# Patient Record
Sex: FEMALE | Race: WHITE | Employment: FULL TIME | URBAN - METROPOLITAN AREA
[De-identification: names, ages, dates, MRNs, and addresses within clinical notes are randomized per-mention and may not be internally consistent; named-entity substitution may affect disease eponyms.]

---

## 2022-04-20 ENCOUNTER — TRANSCRIBE ORDER (OUTPATIENT)
Dept: SCHEDULING | Age: 67
End: 2022-04-20

## 2022-04-20 DIAGNOSIS — Z12.31 OTHER SCREENING MAMMOGRAM: Primary | ICD-10-CM

## 2022-04-20 DIAGNOSIS — M81.0 OSTEOPOROSIS: ICD-10-CM

## 2022-04-26 ENCOUNTER — HOSPITAL ENCOUNTER (OUTPATIENT)
Dept: MAMMOGRAPHY | Age: 67
Discharge: HOME | End: 2022-04-26
Attending: FAMILY MEDICINE
Payer: COMMERCIAL

## 2022-04-26 DIAGNOSIS — Z12.31 OTHER SCREENING MAMMOGRAM: ICD-10-CM

## 2022-04-26 DIAGNOSIS — M81.0 OSTEOPOROSIS: ICD-10-CM

## 2022-04-26 PROCEDURE — 77063 BREAST TOMOSYNTHESIS BI: CPT

## 2022-04-26 PROCEDURE — 77080 DXA BONE DENSITY AXIAL: CPT

## 2022-05-20 ENCOUNTER — HOSPITAL ENCOUNTER (EMERGENCY)
Age: 67
Discharge: HOME | End: 2022-05-20
Attending: EMERGENCY MEDICINE | Admitting: EMERGENCY MEDICINE
Payer: COMMERCIAL

## 2022-05-20 ENCOUNTER — APPOINTMENT (OUTPATIENT)
Dept: GENERAL RADIOLOGY | Age: 67
End: 2022-05-20
Attending: PHYSICIAN ASSISTANT
Payer: COMMERCIAL

## 2022-05-20 VITALS
OXYGEN SATURATION: 97 % | HEART RATE: 102 BPM | BODY MASS INDEX: 24.44 KG/M2 | DIASTOLIC BLOOD PRESSURE: 89 MMHG | SYSTOLIC BLOOD PRESSURE: 138 MMHG | RESPIRATION RATE: 16 BRPM | WEIGHT: 121.25 LBS | HEIGHT: 59 IN | TEMPERATURE: 98.2 F
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
## 2022-05-20 DIAGNOSIS — S42.025A CLOSED NONDISPLACED FRACTURE OF SHAFT OF LEFT CLAVICLE, INITIAL ENCOUNTER: Primary | ICD-10-CM

## 2022-05-20 PROCEDURE — 99283 EMERGENCY DEPT VISIT LOW MDM: CPT

## 2022-05-20 PROCEDURE — 74011250637 HC RX REV CODE- 250/637: Performed by: PHYSICIAN ASSISTANT

## 2022-05-20 PROCEDURE — 73030 X-RAY EXAM OF SHOULDER: CPT

## 2022-05-20 RX ORDER — QUINAPRIL 20 MG/1
20 TABLET ORAL DAILY
COMMUNITY

## 2022-05-20 RX ORDER — IBUPROFEN 400 MG/1
800 TABLET ORAL
Status: COMPLETED | OUTPATIENT
Start: 2022-05-20 | End: 2022-05-20

## 2022-05-20 RX ORDER — FAMOTIDINE 20 MG/1
20 TABLET, FILM COATED ORAL 2 TIMES DAILY
Qty: 20 TABLET | Refills: 0 | Status: SHIPPED | OUTPATIENT
Start: 2022-05-20 | End: 2022-05-30

## 2022-05-20 RX ADMIN — IBUPROFEN 800 MG: 400 TABLET, FILM COATED ORAL at 14:34

## 2022-05-20 NOTE — DISCHARGE INSTRUCTIONS
Max dose of Acetaminophen (Tylenol):  4 g per day from all sources. Please note most acetaminophen is dosed in mg. As such, 4000 mg per day is the maximum dosing per day. Please also note that some cold medications contain acetaminophen. Please do not take this high dosing for long periods of time as this can affect your liver. Max dose of ibuprofen (Advil, Aleve):  2400 mg per day from all sources. You can take either 600 mg four times daily or 800 mg three times daily. Please do not exceed this as it can be hard on your kidneys. Please take with a small amount of food if it causes stomach upset. Please do not take this high dosing for long periods of time as this can cause adverse effects, such as ulcers and GI bleeding. Given your history of a stomach ulcer as a child, please take course of Pepcid while you treat your clavicle fracture and please lean on Tylenol as opposed to ibuprofen. Please do not sleep with the sling in place as this can be a strangulation hazard.

## 2022-05-20 NOTE — ED NOTES
L arm sling applied. PA reviewed discharge instructions with the patient. The patient verbalized understanding.

## 2022-05-20 NOTE — ED TRIAGE NOTES
Pt ambulatory to ED with c/o left shoudler injury last night. \"I got startled when I got up to use the restroom and fell onto my shoulder\". Denies LOC.

## 2022-05-20 NOTE — ED PROVIDER NOTES
Mike Hdz is a 74 y.o. female with PMhx of HTN, DM who presents to ED with cc of/10 left shoulder pain. Patient reports she had a mechanical fall around 430 this morning and landed on her left shoulder. She denies paresthesias or wound. Denies head injury, loss of consciousness, blood thinner use, neck pain or back pain. She denies chest pain or shortness of breath. PMHx: Reviewed, as below. PSHx: Reviewed, as below. PCP: Anabel Witt MD    There are no other complaints verbalized at this time. Past Medical History:   Diagnosis Date    Diabetes ([facility] [address]     Hypertension        Past Surgical History:   Procedure Laterality Date    HX CATARACT REMOVAL           History reviewed. No pertinent family history. Social History     Socioeconomic History    Marital status: SINGLE     Spouse name: Not on file    Number of children: Not on file    Years of education: Not on file    Highest education level: Not on file   Occupational History    Not on file   Tobacco Use    Smoking status: Never Smoker    Smokeless tobacco: Never Used   Substance and Sexual Activity    Alcohol use: Yes    Drug use: Never    Sexual activity: Not on file   Other Topics Concern    Not on file   Social History Narrative    Not on file     Social Determinants of Health     Financial Resource Strain:     Difficulty of Paying Living Expenses: Not on file   Food Insecurity:     Worried About Running Out of Food in the Last Year: Not on file    [place] of Food in the Last Year: Not on file   Transportation Needs:     Lack of Transportation (Medical): Not on file    Lack of Transportation (Non-Medical):  Not on file   Physical Activity:     Days of Exercise per Week: Not on file    Minutes of Exercise per Session: Not on file   Stress:     Feeling of Stress : Not on file   Social Connections:     Frequency of Communication with Friends and Family: Not on file    Frequency of Social Gatherings with Friends and Family: Not on file    Attends [employer] Services: Not on file    Active Member of Clubs or Organizations: Not on file    Attends Club or Organization Meetings: Not on file    Marital Status: Not on file   Intimate Partner Violence:     Fear of Current or Ex-Partner: Not on file    Emotionally Abused: Not on file    Physically Abused: Not on file    Sexually Abused: Not on file   Housing Stability:     Unable to Pay for Housing in the Last Year: Not on file    Number of [place] in the Last Year: Not on file    Unstable Housing in the Last Year: Not on file         ALLERGIES: Penicillins    Review of Systems   Respiratory: Negative for shortness of breath. Cardiovascular: Negative for chest pain. Musculoskeletal: Positive for arthralgias. Negative for neck pain. Skin: Negative for wound. Neurological: Negative for syncope, numbness and headaches. All other systems reviewed and are negative. Vitals:    05/20/22 1408   BP: 138/89   Pulse: (!) 102   Resp: 16   Temp: 98.2 °F (36.8 °C)   SpO2: 97%   Weight: 55 kg (121 lb 4.1 oz)   Height: 4' 11\" (1.499 m)            Physical Exam  Vitals and nursing note reviewed. Constitutional:       Appearance: Normal appearance. She is not diaphoretic. HENT:      Head: Atraumatic. Right Ear: External ear normal.      Left Ear: External ear normal.   Eyes:      Conjunctiva/sclera: Conjunctivae normal.   Cardiovascular:      Rate and Rhythm: Normal rate and regular rhythm. Heart sounds: Normal heart sounds. No murmur heard. No friction rub. No gallop. Pulmonary:      Effort: No respiratory distress. Breath sounds: Normal breath sounds. No stridor. No wheezing, rhonchi or rales. Abdominal:      General: Bowel sounds are normal. There is no distension. Palpations: Abdomen is soft. Tenderness: There is no abdominal tenderness. There is no guarding or rebound. Hernia: No hernia is present.    Musculoskeletal: General: No swelling. Cervical back: Normal range of motion. No rigidity or bony tenderness. Thoracic back: No bony tenderness. Lumbar back: No bony tenderness. Comments: Tenderness to palpation along chest wall. Tenderness to palpation along anterior left shoulder. No tenderness to palpation noted along posterior aspect of shoulder, scapula or chest wall along here. No tenderness to palpation along humerus, elbow or distal arm. Neurovascular intact distally with radial, median and ulnar nerve motor or sensory distributions intact. 2+ radial pulse. No noted break in the skin or skin tenting along clavicle. Skin:     General: Skin is warm and dry. Neurological:      Mental Status: She is alert and oriented to person, place, and time. Mental status is at baseline. Gait: Gait normal.          MDM  Number of Diagnoses or Management Options     Amount and/or Complexity of Data Reviewed  Tests in the radiology section of CPT®: ordered and reviewed  Discuss the patient with other providers: yes (Dr Argelia Servin, ED attending  Orthopedics)           Procedures          CONSULT NOTE:   2:57 PM  Narcisa Toscano PA-C spoke with Dr Avelino Thomas and Marques MARQUEZ,   Specialty: Orthopedics  Discussed pt's hx, disposition, and available diagnostic and imaging results. Reviewed care plans. We will plan for sling and follow-up with Dr. Avelino Thomas. VITAL SIGNS:  Vitals:    05/20/22 1408   BP: 138/89   Pulse: (!) 102   Resp: 16   Temp: 98.2 °F (36.8 °C)   SpO2: 97%   Weight: 55 kg (121 lb 4.1 oz)   Height: 4' 11\" (1.499 m)         LABS:  No results found for this or any previous visit (from the past 24 hour(s)). IMAGING:  XR SHOULDER LT AP/LAT MIN 2 V   Final Result      Comminuted left clavicular diaphyseal extra articular fracture.             Medications During Visit:  Medications   ibuprofen (MOTRIN) tablet 800 mg (800 mg Oral Given 5/20/22 1434)         DECISION MAKING:    Melita Jorge is a 74 y.o. female who comes in as above. Reports mechanical fall with subsequent left shoulder pain. Strays demonstrating comminuted left clavicular diaphyseal extra-articular fracture. She is neurovascularly intact distally. There is no evidence of skin tenting or noted break in the skin. She has no chest pain, shortness of breath and she is 97% on room air. Have discussed with orthopedics. We will plan for sling, follow-up and other supportive treatment. Pt has been given close return precautions as well as follow up recommendations. Opportunity for questions presented. Pt verbalizes their understanding and agreement with care plan. IMPRESSION:  1. Closed nondisplaced fracture of shaft of left clavicle, initial encounter    2. Fall, initial encounter        DISPOSITION:  Discharged      Discharge Medication List as of 5/20/2022  3:10 PM      START taking these medications    Details   famotidine (Pepcid) 20 mg tablet Take 1 Tablet by mouth two (2) times a day for 10 days. , Print, Disp-20 Tablet, R-0         CONTINUE these medications which have NOT CHANGED    Details   metformin HCl (METFORMIN PO) Take  by mouth., Historical Med      quinapriL (ACCUPRIL) 20 mg tablet Take 20 mg by mouth daily. , Historical Med              Follow-up Information     Follow up With Specialties Details Why Contact Info    Caitlin Adhikari MD Orthopedic Surgery Schedule an appointment as soon as possible for a visit   [address] [place] [address]  [phone]      [address] [facility] Emergency Medicine Go to  As needed, If symptoms worsen [address] [place] [address]  [phone]            The patient is asked to follow-up with their primary care provider and any other physicians as above. We have discussed strict return precautions and the patient is asked to return promptly for any increased concerns or worsening of symptoms and for return precautions regarding their symptoms today.   They can return to this emergency department or any other emergency department. I have discussed with them results as above and presented opportunity for questions. They verbalize their understanding of the above and agreement with care plan. Please note that this dictation was completed with [employer], the computer voice recognition software. Quite often unanticipated grammatical, syntax, homophones, and other interpretive errors are inadvertently transcribed by the computer software. Please disregard these errors. Please excuse any errors that have escaped final proofreading.

## 2022-05-25 ENCOUNTER — HOSPITAL ENCOUNTER (OUTPATIENT)
Dept: PREADMISSION TESTING | Age: 67
Discharge: HOME | End: 2022-05-25
Payer: COMMERCIAL

## 2022-05-25 VITALS
WEIGHT: 118.39 LBS | DIASTOLIC BLOOD PRESSURE: 83 MMHG | OXYGEN SATURATION: 100 % | HEART RATE: 90 BPM | HEIGHT: 58 IN | SYSTOLIC BLOOD PRESSURE: 148 MMHG | BODY MASS INDEX: 24.85 KG/M2 | TEMPERATURE: 97.8 F

## 2022-05-25 LAB
ANION GAP SERPL CALC-SCNC: 4 MMOL/L (ref 5–15)
BASOPHILS # BLD: 0 K/UL (ref 0–0.1)
BASOPHILS NFR BLD: 1 % (ref 0–1)
BUN SERPL-MCNC: 30 MG/DL (ref 6–20)
BUN/CREAT SERPL: 68 (ref 12–20)
CALCIUM SERPL-MCNC: 9.4 MG/DL (ref 8.5–10.1)
CHLORIDE SERPL-SCNC: 108 MMOL/L (ref 97–108)
CO2 SERPL-SCNC: 29 MMOL/L (ref 21–32)
CREAT SERPL-MCNC: 0.44 MG/DL (ref 0.55–1.02)
DIFFERENTIAL METHOD BLD: ABNORMAL
EOSINOPHIL # BLD: 0.1 K/UL (ref 0–0.4)
EOSINOPHIL NFR BLD: 1 % (ref 0–7)
ERYTHROCYTE [DISTWIDTH] IN BLOOD BY AUTOMATED COUNT: 13.2 % (ref 11.5–14.5)
EST. AVERAGE GLUCOSE BLD GHB EST-MCNC: 120 MG/DL
GLUCOSE SERPL-MCNC: 116 MG/DL (ref 65–100)
HBA1C MFR BLD: 5.8 % (ref 4–5.6)
HCT VFR BLD AUTO: 42.9 % (ref 35–47)
HGB BLD-MCNC: 13.9 G/DL (ref 11.5–16)
IMM GRANULOCYTES # BLD AUTO: 0.1 K/UL (ref 0–0.04)
IMM GRANULOCYTES NFR BLD AUTO: 1 % (ref 0–0.5)
LYMPHOCYTES # BLD: 1.6 K/UL (ref 0.8–3.5)
LYMPHOCYTES NFR BLD: 21 % (ref 12–49)
MCH RBC QN AUTO: 30.1 PG (ref 26–34)
MCHC RBC AUTO-ENTMCNC: 32.4 G/DL (ref 30–36.5)
MCV RBC AUTO: 92.9 FL (ref 80–99)
MONOCYTES # BLD: 0.6 K/UL (ref 0–1)
MONOCYTES NFR BLD: 8 % (ref 5–13)
NEUTS SEG # BLD: 5.2 K/UL (ref 1.8–8)
NEUTS SEG NFR BLD: 68 % (ref 32–75)
NRBC # BLD: 0 K/UL (ref 0–0.01)
NRBC BLD-RTO: 0 PER 100 WBC
PLATELET # BLD AUTO: 457 K/UL (ref 150–400)
PMV BLD AUTO: 9.2 FL (ref 8.9–12.9)
POTASSIUM SERPL-SCNC: 4.4 MMOL/L (ref 3.5–5.1)
RBC # BLD AUTO: 4.62 M/UL (ref 3.8–5.2)
SODIUM SERPL-SCNC: 141 MMOL/L (ref 136–145)
WBC # BLD AUTO: 7.6 K/UL (ref 3.6–11)

## 2022-05-25 PROCEDURE — 83036 HEMOGLOBIN GLYCOSYLATED A1C: CPT

## 2022-05-25 PROCEDURE — 85025 COMPLETE CBC W/AUTO DIFF WBC: CPT

## 2022-05-25 PROCEDURE — 80048 BASIC METABOLIC PNL TOTAL CA: CPT

## 2022-05-25 RX ORDER — CHOLESTYRAMINE 4 G/5.5G
POWDER, FOR SUSPENSION ORAL DAILY
COMMUNITY

## 2022-05-25 RX ORDER — ASPIRIN 81 MG/1
TABLET ORAL DAILY
COMMUNITY

## 2022-05-25 RX ORDER — ASCORBIC ACID 500 MG
TABLET ORAL
COMMUNITY

## 2022-05-25 RX ORDER — CHOLECALCIFEROL (VITAMIN D3) 50 MCG
CAPSULE ORAL
COMMUNITY

## 2022-05-25 NOTE — PERIOP NOTES
[id number] INSTRUCTIONS    Surgery Date:   5/26/22    [facility] staff will call you between 4 PM- 8 PM the day before surgery with your arrival time. If your surgery is on a Monday, we will call you the preceding Friday. Please call [phone] after 8 PM if you did not receive your arrival time. 1. Please report to [facility] Patient Access/Admitting on the 1st floor. Bring your insurance card, photo identification, and any copayment ( if applicable). 2. If you are going home the same day of your surgery, you must have a responsible adult to drive you home. You need to have a responsible adult to stay with you the first 24 hours after surgery and you should not drive a car for 24 hours following your surgery. 3. Nothing to eat or drink after midnight the night before surgery. This includes no water, gum, mints, coffee, juice, etc.  Please note special instructions, if applicable, below for medications. 4. Do NOT drink alcohol or smoke 24 hours before surgery. STOP smoking for 14 days prior as it helps with breathing and healing after surgery. 5. If you are being admitted to the hospital, please leave personal belongings/luggage in your car until you have an assigned hospital room number. 6. Please wear comfortable clothes. Wear your glasses instead of contacts. We ask that all money, jewelry and valuables be left at home. Wear no make up, particularly mascara, the day of surgery. 7.  All body piercings, rings, and jewelry need to be removed and left at home. Please remove any nail polish or artificial nails from your fingernails. Please wear your hair loose or down. Please no pony-tails, buns, or any metal hair accessories. If you shower the morning of surgery, please do not apply any lotions or powders afterwards. You may wear deodorant, unless having breast surgery. Do not shave any body area within 24 hours of your surgery.   8. Please follow all instructions to avoid any potential surgical cancellation. 9. Should your physical condition change, (i.e. fever, cold, flu, etc.) please notify your surgeon as soon as possible. 10. It is important to be on time. If a situation occurs where you may be delayed, please call:  [phone] / [phone] on the day of surgery. 11. The Preadmission Testing staff can be reached at [phone]. 12. Special instructions: NONE      Current Outpatient Medications   Medication Sig    Cholestyramine-Aspartame (Prevalite) 4 gram powder Take  by mouth daily.  aspirin delayed-release 81 mg tablet Take  by mouth daily.  omega 3-dha-epa-fish oil (Fish OiL) 100-160-1,000 mg cap Take  by mouth.  ascorbic acid, vitamin C, (Vitamin C) 500 mg tablet Take  by mouth.  OTHER PLANT STEROLS    berberine/herbal complex no.18 (BERBERINE-HERBAL COMB NO.18 PO) Take  by mouth.  OTHER THISOLYN    metformin HCl (METFORMIN PO) Take 500 mg by mouth two (2) times a day.  quinapriL (ACCUPRIL) 20 mg tablet Take 20 mg by mouth daily.  famotidine (Pepcid) 20 mg tablet Take 1 Tablet by mouth two (2) times a day for 10 days. No current facility-administered medications for this encounter. 1. YOU MUST ONLY TAKE THESE MEDICATIONS THE MORNING OF SURGERY WITH A SIP OF WATER: FAMOTIDINE  2. MEDICATIONS TO TAKE THE MORNING OF SURGERY ONLY IF NEEDED: NONE  3. HOLD these prescription medications BEFORE Surgery: METFORMIN (STOP ON 5/25/22)  4. Ask your surgeon/prescribing physician about when/if to STOP taking these medications: ASPIRIN  5. Stop all vitamins, herbal medicines and Aspirin containing products 7 days prior to surgery. Stop any non-steroidal anti-inflammatory drugs (i.e. Ibuprofen, Naproxen, Advil, Aleve) 3 days before surgery. You may take Tylenol. 6. If you are currently taking Plavix, Coumadin, or any other blood-thinning/anticoagulant medication contact your prescribing physician for instructions.     Preventing Infections Before and After  Your Surgery    IMPORTANT INSTRUCTIONS      You play an important role in your health and preparation for surgery. To reduce the germs on your skin you will need to shower with CHG soap (Chorhexidine gluconate 4%) two times before surgery. CHG soap (Hibiclens, Hex-A-Clens or store brand)   CHG soap will be provided at your Preadmission Testing (PAT) appointment.  If you do not have a PAT appointment before surgery, you may arrange to  CHG soap from our office or purchase CHG soap at a pharmacy, grocery or department store.  You need to purchase TWO 4 ounce bottles to use for your 2 showers. Steps to follow:  1. Wash your hair with your normal shampoo and your body with regular soap and rinse well to remove shampoo and soap from your skin. 2. Wet a clean washcloth and turn off the shower. 3. Put CHG soap on washcloth and apply to your entire body from the neck down. Do not use on your head, face or private parts(genitals). Do not use CHG soap on open sores, wounds or areas of skin irritation. 4. Wash you body gently for 5 minutes. Do not wash your skin too hard. This soap does not create lather. Pay special attention to your underarms and from your belly button to your feet. 5. Turn the shower back on and rinse well to get CHG soap off your body. 6. Pat your skin dry with a clean, dry towel. Do not apply lotions or moisturizer. 7. Put on clean clothes and sleep on fresh bed sheets and do not allow pets to sleep with you. Shower with CHG soap 2 times before your surgery   The evening before your surgery   The morning of your surgery      Tips to help prevent infections after your surgery:  1. Protect your surgical wound from germs:  ? Hand washing is the most important thing you and your caregivers can do to prevent infections. ? Keep your bandage clean and dry! ? Do not touch your surgical wound.   2. Use clean, freshly washed towels and washcloths every time you shower; do not share bath linens with others. 3. Until your surgical wound is healed, wear clothing and sleep on bed linens each day that are clean and freshly washed. 4. Do not allow pets to sleep in your bed with you or touch your surgical wound. 5. Do not smoke  smoking delays wound healing. This may be a good time to stop smoking. 6. If you have diabetes, it is important for you to manage your blood sugar levels properly before your surgery as well as after your surgery. Poorly managed blood sugar levels slow down wound healing and prevent you from healing completely. Patient Information Regarding COVID Restrictions    Day of Procedure     Please park in the parking deck or any designated visitor parking lot.  [facility]   On the day of surgery, please provide the cell phone number of the person who will be waiting for you to the Patient Access representative at the time of registration.  Please wear a mask on the day of your procedure.  We are now allowing two designated visitors per stay. Pediatric patients may have 2 designated visitors. These two people may come in with you on the day of your procedure.  No visitors under the age of 13.  The designated visitor must also wear a mask.  Once your procedure and the immediate recovery period is completed, a nurse in the recovery area will contact your designated visitor to inform them of your room number or to otherwise review other pertinent information regarding your care.  Social distancing practices are to be adhered to in waiting areas and the cafeteria. The patient was contacted  in person. She verbalized understanding of all instructions and does not  need reinforcement.

## 2022-05-25 NOTE — H&P
CARE TEAM:  Patient Care Team:  Cynthia Goldstein MD as PCP - General (Family Medicine)     Note: This chart was prepared using voice-recognition software and may contain unintended word substitution errors. An addendum for corrections can be made upon request.      ASSESSMENT    Diagnosis Plan   1. Closed displaced fracture of shaft of left clavicle, initial encounter HYDROcodone-acetaminophen (NORCO) 5-325 MG per tablet       2. Injury of left clavicle, initial encounter X-ray clavicle left (02073)          There is no problem list on file for this patient.        PLAN  Treatment Plan:  -We discussed the patient's diagnosis and reviewed the diagnostic imaging. Showed her the displacement of the clavicle fracture  -We reviewed nonoperative treatment options including rest, ice/heat, compression, elevation, and sling use  -We discussed surgical and nonsurgical management options. Because of the displacement, I recommended surgical fixation. We discussed the risks, benefits, complications, convalescence regarding open reduction internal fixation of the clavicle (65342). I addressed with her the chest wall numbness and high percentage of patients who are agitated by the subcutaneous plane and request removal.  She displayed good understanding and agreed to proceed forth with surgery  -a prescription was sent for hydrocodone so that she can pick it up and have it at her house before surgery for postoperative pain control. I will also ask for an interscalene block for pain relief during the initial 24 hours. -planning surgery Thursday 05/26/2022 at [facility] at 9:30 a.m.            Orders Placed This Encounter    X-ray clavicle left (99292)    BP Patient Education    HYDROcodone-acetaminophen ([address]) 5-325 MG per tablet            Return for Post-Op Check.        HISTORY OF PRESENT ILLNESS  Chief Complaint: Injury and Pain of the Left Shoulder     Age: 74 y.o.   Sex: female   Hand-dominance: RightHistory of present illness: Ms. Andrew Mcgregor presents today for evaluation of left shoulder pain. Symptoms began 5/20/22. She was startled by her alarm clock early in the morning which caused her to roll off the bed and land on her side. She was seen in the emergency department and found to have a clavicle fracture. She is placed in a sling and instructor for follow-up. Overall her pain has been controlled. She says that they gave her medications for pain, but they actually gave her from [place]. She has bruising over the site. She denies any previous similar symptoms. She is scheduled to retire at the end of June but has a lot of a medical leave that she can take.     Pain rating =   5 out of 10        Medical History        Past Medical History:   Diagnosis Date    Diabetes mellitus      Dry eyes      Hypertension              Surgical History   History reviewed. No pertinent surgical history.           Current Outpatient Medications:    famotidine (PEPCID) 20 MG tablet, TAKE 1 TABLET BY MOUTH TWO TIMES A DAY FOR 10 DAYS, Disp: , Rfl:    metFORMIN (GLUCOPHAGE) 500 MG tablet, Take 500 mg by mouth in the morning and 500 mg before bedtime. , Disp: , Rfl:    quinapril (ACCUPRIL) 20 MG tablet, every evening, Disp: , Rfl:    Xiidra 5 % solution, INSTILL 1 DROP INTO BOTH EYES TWICE A DAY, Disp: , Rfl:    HYDROcodone-acetaminophen (NORCO) 5-325 MG per tablet, Take 1-2 tablets by mouth every 6 (six) hours as needed for moderate pain or severe pain for up to 7 days Take 1-2  tabs every 6 hours as needed for moderate-severe pain, Disp: 30 tablet, Rfl: 0      History reviewed.  No pertinent family history.      Social History               Socioeconomic History    Marital status: Single       Spouse name: Not on file    Number of children: Not on file    Years of education: Not on file    Highest education level: Not on file   Occupational History    Not on file   Tobacco Use    Smoking status: Never    Smokeless tobacco: Never   Substance and Sexual Activity    Alcohol use: Not Currently    Drug use: Never    Sexual activity: Not on file   Other Topics Concern    Not on file   Social History Narrative    Not on file      Social Determinants of Health      Financial Resource Strain: Not on file   Food Insecurity: Not on file   Transportation Needs: Not on file   Physical Activity: Not on file   Stress: Not on file   Social Connections: Not on file   Intimate Partner Violence: Not on file   Housing Stability: Not on file               OBJECTIVE  Constitutional:  No acute distress. Her body mass index is 24.86 kg/m². Eyes:  Sclera are nonicteric. Respiratory:  No labored breathing. Cardiovascular:  No marked edema. Skin:  No marked skin ulcers. Neurological:  No marked sensory loss noted. Psychiatric: Alert and oriented x3.     Left Shoulder Exam      Muscle Strength  Abduction: 4/5  Internal rotation: 4/5  External rotation: 4/5                Shoulder Musculoskeletal Exam     Inspection    Left      Ecchymosis: moderate      Peripheral edema: mild      Atrophy: none      Symmetry: symmetric      Masses: none     Palpation    Left      Crepitus: no crepitus      Increased warmth: none      Tenderness: present        Clavicle: moderate        AC joint: mild     Range of Motion    Left      Active ROM: abnormal.     Strength    Left      External rotation: 4/5. External rotation is affected by pain. Internal rotation: 4/5. Internal rotation is affected by pain. Abduction: 4/5.  Abduction is affected by pain.      Neurovascular    Left      Radial pulse: normal      Axillary nerve sensory distribution: normal      Ulnar nerve sensory distribution: normal      Median nerve sensory distribution: normal      Radial nerve sensory distribution: normal      Musculocutaneous nerve sensory distribution: normal     Scapula    Left      Position: normal     General      Constitutional: appears stated age, well-developed and well-nourished    Scleral icterus: yes    Psychiatric: normal mood and affect and no acute distress    Neurological: alert and oriented x3     IMAGING / STUDIES   Order: XR CLAVICLE LEFT - Indication: Injury of left clavicle, initial  encounter          X-ray clavicle left ([id number])     Result Date: 5/24/2022  AP, Axial.     Impression: Two view x-rays of left clavicle show a mid shaft clavicle fracture with over 100% superior displacement of the medial fragment.         I have independently reviewed outside diagnostic imaging on 5/20/22. Left midshaft clavicle fracture. Overall alignment well maintained. Unable to assess overlap. I agree with the radiologist read:  Tricia Jay MD - 05/20/2022   Formatting of this note might be different from the original.   EXAM: XR SHOULDER LT AP/LAT MIN 2 V     INDICATION: Left shoulder pain after fall. COMPARISON: None. FINDINGS: Three views of the left shoulder demonstrate comminuted, mildly   displaced, mildly impacted fracture of the left clavicular diaphysis. Bones are   osteopenic. No evidence of proximal humerus fracture. Mild acromioclavicular   osteoarthritis. Normal glenohumeral joint space and alignment. IMPRESSION     Comminuted left clavicular diaphyseal extra articular fracture.   Exam End: 05/20/22  2:30 PM     Specimen Collected: 05/20/22  2:33 PM Last Resulted: 05/20/22  2:34 PM        PROCEDURES  Procedures           Yuly Crawford MD

## 2022-05-25 NOTE — PERIOP NOTES
Preoperative instructions reviewed with patient and her sister Clovis Piña. Patient given SSI infection FAQS sheet. Given two bottles of skin prep chlorhexidine soap; given written and verbal instructions on use. Patient/Sister was given the opportunity to ask questions on the information provided.

## 2022-05-26 ENCOUNTER — ANESTHESIA (OUTPATIENT)
Dept: SURGERY | Age: 67
End: 2022-05-26
Payer: COMMERCIAL

## 2022-05-26 ENCOUNTER — APPOINTMENT (OUTPATIENT)
Dept: GENERAL RADIOLOGY | Age: 67
End: 2022-05-26
Attending: ORTHOPAEDIC SURGERY
Payer: COMMERCIAL

## 2022-05-26 ENCOUNTER — ANESTHESIA EVENT (OUTPATIENT)
Dept: SURGERY | Age: 67
End: 2022-05-26
Payer: COMMERCIAL

## 2022-05-26 ENCOUNTER — HOSPITAL ENCOUNTER (OUTPATIENT)
Age: 67
Setting detail: OUTPATIENT SURGERY
Discharge: HOME | End: 2022-05-26
Attending: ORTHOPAEDIC SURGERY | Admitting: ORTHOPAEDIC SURGERY
Payer: COMMERCIAL

## 2022-05-26 VITALS
SYSTOLIC BLOOD PRESSURE: 119 MMHG | HEIGHT: 58 IN | TEMPERATURE: 97.6 F | OXYGEN SATURATION: 93 % | WEIGHT: 118.39 LBS | RESPIRATION RATE: 21 BRPM | HEART RATE: 92 BPM | BODY MASS INDEX: 24.85 KG/M2 | DIASTOLIC BLOOD PRESSURE: 91 MMHG

## 2022-05-26 LAB
GLUCOSE BLD STRIP.AUTO-MCNC: 114 MG/DL (ref 65–117)
GLUCOSE BLD STRIP.AUTO-MCNC: 130 MG/DL (ref 65–117)
SERVICE CMNT-IMP: ABNORMAL
SERVICE CMNT-IMP: NORMAL

## 2022-05-26 PROCEDURE — C1713 ANCHOR/SCREW BN/BN,TIS/BN: HCPCS | Performed by: ORTHOPAEDIC SURGERY

## 2022-05-26 PROCEDURE — 77030020275 HC MISC ORTHOPEDIC: Performed by: ORTHOPAEDIC SURGERY

## 2022-05-26 PROCEDURE — 74011250636 HC RX REV CODE- 250/636: Performed by: ANESTHESIOLOGY

## 2022-05-26 PROCEDURE — 76210000016 HC OR PH I REC 1 TO 1.5 HR: Performed by: ORTHOPAEDIC SURGERY

## 2022-05-26 PROCEDURE — A4565 SLINGS: HCPCS | Performed by: ORTHOPAEDIC SURGERY

## 2022-05-26 PROCEDURE — 77030040361 HC SLV COMPR DVT MDII -B: Performed by: ORTHOPAEDIC SURGERY

## 2022-05-26 PROCEDURE — 76060000035 HC ANESTHESIA 2 TO 2.5 HR: Performed by: ORTHOPAEDIC SURGERY

## 2022-05-26 PROCEDURE — 74011000250 HC RX REV CODE- 250: Performed by: ORTHOPAEDIC SURGERY

## 2022-05-26 PROCEDURE — 76010000153 HC OR TIME 1.5 TO 2 HR: Performed by: ORTHOPAEDIC SURGERY

## 2022-05-26 PROCEDURE — 77030031139 HC SUT VCRL2 J&J -A: Performed by: ORTHOPAEDIC SURGERY

## 2022-05-26 PROCEDURE — 74011250636 HC RX REV CODE- 250/636: Performed by: ORTHOPAEDIC SURGERY

## 2022-05-26 PROCEDURE — 77030002933 HC SUT MCRYL J&J -A: Performed by: ORTHOPAEDIC SURGERY

## 2022-05-26 PROCEDURE — 77030040922 HC BLNKT HYPOTHRM STRY -A

## 2022-05-26 PROCEDURE — 74011250636 HC RX REV CODE- 250/636: Performed by: NURSE ANESTHETIST, CERTIFIED REGISTERED

## 2022-05-26 PROCEDURE — 77030008684 HC TU ET CUF COVD -B: Performed by: ANESTHESIOLOGY

## 2022-05-26 PROCEDURE — 82962 GLUCOSE BLOOD TEST: CPT

## 2022-05-26 PROCEDURE — 77030020274 HC MISC IMPL ORTHOPEDIC: Performed by: ORTHOPAEDIC SURGERY

## 2022-05-26 PROCEDURE — 76210000020 HC REC RM PH II FIRST 0.5 HR: Performed by: ORTHOPAEDIC SURGERY

## 2022-05-26 PROCEDURE — 73000 X-RAY EXAM OF COLLAR BONE: CPT

## 2022-05-26 PROCEDURE — 77030026438 HC STYL ET INTUB CARD -A: Performed by: ANESTHESIOLOGY

## 2022-05-26 PROCEDURE — 2709999900 HC NON-CHARGEABLE SUPPLY: Performed by: ORTHOPAEDIC SURGERY

## 2022-05-26 PROCEDURE — 77030042508 HC BIT DRL -B: Performed by: ORTHOPAEDIC SURGERY

## 2022-05-26 PROCEDURE — 74011000250 HC RX REV CODE- 250: Performed by: NURSE ANESTHETIST, CERTIFIED REGISTERED

## 2022-05-26 PROCEDURE — 77030010507 HC ADH SKN DERMBND J&J -B: Performed by: ORTHOPAEDIC SURGERY

## 2022-05-26 PROCEDURE — 77030003601 HC NDL NRV BLK BBMI -A

## 2022-05-26 DEVICE — IMPLANTABLE DEVICE: Type: IMPLANTABLE DEVICE | Site: SHOULDER | Status: FUNCTIONAL

## 2022-05-26 DEVICE — SCR BNE NLCK LP 2.7X12MM --: Type: IMPLANTABLE DEVICE | Site: SHOULDER | Status: FUNCTIONAL

## 2022-05-26 DEVICE — SCR BNE NLCK LP 2.7X16MM -- DVR CROSSLOCK: Type: IMPLANTABLE DEVICE | Site: SHOULDER | Status: FUNCTIONAL

## 2022-05-26 DEVICE — SCR BNE NLCK LP 2.7X14MM --: Type: IMPLANTABLE DEVICE | Site: SHOULDER | Status: FUNCTIONAL

## 2022-05-26 RX ORDER — ONDANSETRON 2 MG/ML
4 INJECTION INTRAMUSCULAR; INTRAVENOUS AS NEEDED
Status: DISCONTINUED | OUTPATIENT
Start: 2022-05-26 | End: 2022-05-26 | Stop reason: HOSPADM

## 2022-05-26 RX ORDER — PROPOFOL 10 MG/ML
INJECTION, EMULSION INTRAVENOUS AS NEEDED
Status: DISCONTINUED | OUTPATIENT
Start: 2022-05-26 | End: 2022-05-26 | Stop reason: HOSPADM

## 2022-05-26 RX ORDER — SODIUM CHLORIDE 0.9 % (FLUSH) 0.9 %
5-40 SYRINGE (ML) INJECTION EVERY 8 HOURS
Status: DISCONTINUED | OUTPATIENT
Start: 2022-05-26 | End: 2022-05-26 | Stop reason: HOSPADM

## 2022-05-26 RX ORDER — DEXAMETHASONE SODIUM PHOSPHATE 4 MG/ML
INJECTION, SOLUTION INTRA-ARTICULAR; INTRALESIONAL; INTRAMUSCULAR; INTRAVENOUS; SOFT TISSUE AS NEEDED
Status: DISCONTINUED | OUTPATIENT
Start: 2022-05-26 | End: 2022-05-26 | Stop reason: HOSPADM

## 2022-05-26 RX ORDER — MORPHINE SULFATE 2 MG/ML
2 INJECTION, SOLUTION INTRAMUSCULAR; INTRAVENOUS
Status: DISCONTINUED | OUTPATIENT
Start: 2022-05-26 | End: 2022-05-26 | Stop reason: HOSPADM

## 2022-05-26 RX ORDER — SODIUM CHLORIDE 9 MG/ML
25 INJECTION, SOLUTION INTRAVENOUS CONTINUOUS
Status: DISCONTINUED | OUTPATIENT
Start: 2022-05-26 | End: 2022-05-26 | Stop reason: HOSPADM

## 2022-05-26 RX ORDER — SUCCINYLCHOLINE CHLORIDE 20 MG/ML
INJECTION INTRAMUSCULAR; INTRAVENOUS AS NEEDED
Status: DISCONTINUED | OUTPATIENT
Start: 2022-05-26 | End: 2022-05-26 | Stop reason: HOSPADM

## 2022-05-26 RX ORDER — ROCURONIUM BROMIDE 10 MG/ML
INJECTION, SOLUTION INTRAVENOUS AS NEEDED
Status: DISCONTINUED | OUTPATIENT
Start: 2022-05-26 | End: 2022-05-26 | Stop reason: HOSPADM

## 2022-05-26 RX ORDER — ROPIVACAINE HYDROCHLORIDE 5 MG/ML
30 INJECTION, SOLUTION EPIDURAL; INFILTRATION; PERINEURAL AS NEEDED
Status: DISCONTINUED | OUTPATIENT
Start: 2022-05-26 | End: 2022-05-26 | Stop reason: HOSPADM

## 2022-05-26 RX ORDER — SODIUM CHLORIDE, SODIUM LACTATE, POTASSIUM CHLORIDE, CALCIUM CHLORIDE 600; 310; 30; 20 MG/100ML; MG/100ML; MG/100ML; MG/100ML
100 INJECTION, SOLUTION INTRAVENOUS CONTINUOUS
Status: DISCONTINUED | OUTPATIENT
Start: 2022-05-26 | End: 2022-05-26 | Stop reason: HOSPADM

## 2022-05-26 RX ORDER — ONDANSETRON 2 MG/ML
INJECTION INTRAMUSCULAR; INTRAVENOUS AS NEEDED
Status: DISCONTINUED | OUTPATIENT
Start: 2022-05-26 | End: 2022-05-26 | Stop reason: HOSPADM

## 2022-05-26 RX ORDER — NORETHINDRONE AND ETHINYL ESTRADIOL 0.5-0.035
KIT ORAL AS NEEDED
Status: DISCONTINUED | OUTPATIENT
Start: 2022-05-26 | End: 2022-05-26 | Stop reason: HOSPADM

## 2022-05-26 RX ORDER — SODIUM CHLORIDE 0.9 % (FLUSH) 0.9 %
5-40 SYRINGE (ML) INJECTION AS NEEDED
Status: DISCONTINUED | OUTPATIENT
Start: 2022-05-26 | End: 2022-05-26 | Stop reason: HOSPADM

## 2022-05-26 RX ORDER — MIDAZOLAM HYDROCHLORIDE 1 MG/ML
1 INJECTION, SOLUTION INTRAMUSCULAR; INTRAVENOUS AS NEEDED
Status: DISCONTINUED | OUTPATIENT
Start: 2022-05-26 | End: 2022-05-26 | Stop reason: HOSPADM

## 2022-05-26 RX ORDER — LIDOCAINE HYDROCHLORIDE 20 MG/ML
INJECTION, SOLUTION EPIDURAL; INFILTRATION; INTRACAUDAL; PERINEURAL AS NEEDED
Status: DISCONTINUED | OUTPATIENT
Start: 2022-05-26 | End: 2022-05-26 | Stop reason: HOSPADM

## 2022-05-26 RX ORDER — ROPIVACAINE HYDROCHLORIDE 5 MG/ML
INJECTION, SOLUTION EPIDURAL; INFILTRATION; PERINEURAL
Status: COMPLETED | OUTPATIENT
Start: 2022-05-26 | End: 2022-05-26

## 2022-05-26 RX ORDER — OXYCODONE AND ACETAMINOPHEN 5; 325 MG/1; MG/1
TABLET ORAL
COMMUNITY

## 2022-05-26 RX ORDER — FENTANYL CITRATE 50 UG/ML
INJECTION, SOLUTION INTRAMUSCULAR; INTRAVENOUS AS NEEDED
Status: DISCONTINUED | OUTPATIENT
Start: 2022-05-26 | End: 2022-05-26 | Stop reason: HOSPADM

## 2022-05-26 RX ORDER — MIDAZOLAM HYDROCHLORIDE 1 MG/ML
0.5 INJECTION, SOLUTION INTRAMUSCULAR; INTRAVENOUS
Status: DISCONTINUED | OUTPATIENT
Start: 2022-05-26 | End: 2022-05-26 | Stop reason: HOSPADM

## 2022-05-26 RX ORDER — DIPHENHYDRAMINE HYDROCHLORIDE 50 MG/ML
12.5 INJECTION, SOLUTION INTRAMUSCULAR; INTRAVENOUS AS NEEDED
Status: DISCONTINUED | OUTPATIENT
Start: 2022-05-26 | End: 2022-05-26 | Stop reason: HOSPADM

## 2022-05-26 RX ORDER — PHENYLEPHRINE HCL IN 0.9% NACL 0.4MG/10ML
SYRINGE (ML) INTRAVENOUS AS NEEDED
Status: DISCONTINUED | OUTPATIENT
Start: 2022-05-26 | End: 2022-05-26 | Stop reason: HOSPADM

## 2022-05-26 RX ORDER — FENTANYL CITRATE 50 UG/ML
25 INJECTION, SOLUTION INTRAMUSCULAR; INTRAVENOUS
Status: DISCONTINUED | OUTPATIENT
Start: 2022-05-26 | End: 2022-05-26 | Stop reason: HOSPADM

## 2022-05-26 RX ORDER — HYDROMORPHONE HYDROCHLORIDE 1 MG/ML
0.5 INJECTION, SOLUTION INTRAMUSCULAR; INTRAVENOUS; SUBCUTANEOUS
Status: DISCONTINUED | OUTPATIENT
Start: 2022-05-26 | End: 2022-05-26 | Stop reason: HOSPADM

## 2022-05-26 RX ORDER — ACETAMINOPHEN 325 MG/1
650 TABLET ORAL ONCE
Status: DISCONTINUED | OUTPATIENT
Start: 2022-05-26 | End: 2022-05-26 | Stop reason: HOSPADM

## 2022-05-26 RX ORDER — FENTANYL CITRATE 50 UG/ML
50 INJECTION, SOLUTION INTRAMUSCULAR; INTRAVENOUS AS NEEDED
Status: DISCONTINUED | OUTPATIENT
Start: 2022-05-26 | End: 2022-05-26 | Stop reason: HOSPADM

## 2022-05-26 RX ORDER — LIDOCAINE HYDROCHLORIDE 10 MG/ML
0.1 INJECTION, SOLUTION EPIDURAL; INFILTRATION; INTRACAUDAL; PERINEURAL AS NEEDED
Status: DISCONTINUED | OUTPATIENT
Start: 2022-05-26 | End: 2022-05-26 | Stop reason: HOSPADM

## 2022-05-26 RX ORDER — MIDAZOLAM HYDROCHLORIDE 1 MG/ML
INJECTION, SOLUTION INTRAMUSCULAR; INTRAVENOUS AS NEEDED
Status: DISCONTINUED | OUTPATIENT
Start: 2022-05-26 | End: 2022-05-26 | Stop reason: HOSPADM

## 2022-05-26 RX ADMIN — ONDANSETRON HYDROCHLORIDE 4 MG: 2 INJECTION, SOLUTION INTRAMUSCULAR; INTRAVENOUS at 09:50

## 2022-05-26 RX ADMIN — Medication 80 MCG: at 10:05

## 2022-05-26 RX ADMIN — WATER 2 G: 1 INJECTION INTRAMUSCULAR; INTRAVENOUS; SUBCUTANEOUS at 10:00

## 2022-05-26 RX ADMIN — ROCURONIUM BROMIDE 10 MG: 10 SOLUTION INTRAVENOUS at 09:41

## 2022-05-26 RX ADMIN — EPHEDRINE SULFATE 10 MG: 50 INJECTION INTRAVENOUS at 09:55

## 2022-05-26 RX ADMIN — MIDAZOLAM HYDROCHLORIDE 2 MG: 1 INJECTION, SOLUTION INTRAMUSCULAR; INTRAVENOUS at 08:43

## 2022-05-26 RX ADMIN — PHENYLEPHRINE HYDROCHLORIDE 30 MCG/MIN: 10 INJECTION INTRAVENOUS at 10:20

## 2022-05-26 RX ADMIN — LIDOCAINE HYDROCHLORIDE 40 MG: 20 INJECTION, SOLUTION EPIDURAL; INFILTRATION; INTRACAUDAL; PERINEURAL at 09:41

## 2022-05-26 RX ADMIN — DEXAMETHASONE SODIUM PHOSPHATE 4 MG: 4 INJECTION, SOLUTION INTRAMUSCULAR; INTRAVENOUS at 09:50

## 2022-05-26 RX ADMIN — FENTANYL CITRATE 50 MCG: 50 INJECTION, SOLUTION INTRAMUSCULAR; INTRAVENOUS at 10:58

## 2022-05-26 RX ADMIN — MIDAZOLAM 2 MG: 1 INJECTION INTRAMUSCULAR; INTRAVENOUS at 09:33

## 2022-05-26 RX ADMIN — ROPIVACAINE HYDROCHLORIDE 30 ML: 5 INJECTION, SOLUTION EPIDURAL; INFILTRATION; PERINEURAL at 08:50

## 2022-05-26 RX ADMIN — EPHEDRINE SULFATE 10 MG: 50 INJECTION INTRAVENOUS at 10:05

## 2022-05-26 RX ADMIN — FENTANYL CITRATE 50 MCG: 50 INJECTION, SOLUTION INTRAMUSCULAR; INTRAVENOUS at 09:41

## 2022-05-26 RX ADMIN — SODIUM CHLORIDE, POTASSIUM CHLORIDE, SODIUM LACTATE AND CALCIUM CHLORIDE 100 ML/HR: 600; 310; 30; 20 INJECTION, SOLUTION INTRAVENOUS at 08:34

## 2022-05-26 RX ADMIN — PROPOFOL 100 MG: 10 INJECTION, EMULSION INTRAVENOUS at 09:41

## 2022-05-26 RX ADMIN — SODIUM CHLORIDE, POTASSIUM CHLORIDE, SODIUM LACTATE AND CALCIUM CHLORIDE: 600; 310; 30; 20 INJECTION, SOLUTION INTRAVENOUS at 11:02

## 2022-05-26 RX ADMIN — SUCCINYLCHOLINE CHLORIDE 100 MG: 20 INJECTION, SOLUTION INTRAMUSCULAR; INTRAVENOUS at 09:42

## 2022-05-26 RX ADMIN — FENTANYL CITRATE 50 MCG: 50 INJECTION, SOLUTION INTRAMUSCULAR; INTRAVENOUS at 08:43

## 2022-05-26 NOTE — ANESTHESIA POSTPROCEDURE EVALUATION
Procedure(s):  LEFT CLAVICLE OPEN REDUCTION INTERNAL FIXATION (BLOCK). general    Anesthesia Post Evaluation        Patient location during evaluation: PACU  Note status: Adequate. Level of consciousness: responsive to verbal stimuli and sleepy but conscious  Pain management: satisfactory to patient  Airway patency: patent  Anesthetic complications: no  Cardiovascular status: acceptable  Respiratory status: acceptable  Hydration status: acceptable  Comments: +Post-Anesthesia Evaluation and Assessment    Patient: Becky Sandoval MRN: [id number]  SSN: [id number]   YOB: 1955  Age: 74 y.o. Sex: female          Cardiovascular Function/Vital Signs    BP (!) 141/74   Pulse 93   Temp 37 °C (98.6 °F)   Resp 16   Ht 4' 10\" (1.473 m)   Wt 53.7 kg (118 lb 6.2 oz)   SpO2 99%   BMI 24.74 kg/m²     Patient is status post Procedure(s):  LEFT CLAVICLE OPEN REDUCTION INTERNAL FIXATION (BLOCK). Nausea/Vomiting: Controlled. Postoperative hydration reviewed and adequate. Pain:  Pain Scale 1: Numeric (0 - 10) (05/26/22 2670)  Pain Intensity 1: 5 (05/26/22 5996)   Managed. Neurological Status:   Neuro (WDL): Within Defined Limits (05/26/22 0830)   At baseline. Mental Status and Level of Consciousness: Arousable. Pulmonary Status:   O2 Device: Nasal cannula (05/26/22 1131)   Adequate oxygenation and airway patent. Complications related to anesthesia: None    Post-anesthesia assessment completed. No concerns. I have evaluated the patient and the patient is stable and ready to be discharged from PACU . Signed By: Esperanza Luna MD    5/26/2022        INITIAL Post-op Vital signs:   Vitals Value Taken Time   /68 05/26/22 1140   Temp 37 °C (98.6 °F) 05/26/22 1131   Pulse 91 05/26/22 1142   Resp 19 05/26/22 1142   SpO2 97 % 05/26/22 1142   Vitals shown include unvalidated device data.

## 2022-05-26 NOTE — INTERVAL H&P NOTE
Update History & Physical    The Patient's History and Physical of May 24,   2022 was reviewed with the patient and I examined the patient. There was no change. The surgical site was confirmed by the patient and me. Plan:  The risk, benefits, expected outcome, and alternative to the recommended procedure have been discussed with the patient. Patient understands and wants to proceed with the procedure.     Electronically signed by Hilda Estrada MD on 5/26/2022 at 9:21 AM

## 2022-05-26 NOTE — ANESTHESIA PREPROCEDURE EVALUATION
Relevant Problems   No relevant active problems       Anesthetic History   No history of anesthetic complications            Review of Systems / Medical History  Patient summary reviewed, nursing notes reviewed and pertinent labs reviewed    Pulmonary  Within defined limits                 Neuro/Psych   Within defined limits           Cardiovascular  Within defined limits  Hypertension              Exercise tolerance: >4 METS     GI/Hepatic/Renal  Within defined limits         PUD     Endo/Other  Within defined limits  Diabetes    Arthritis     Other Findings              Physical Exam    Airway  Mallampati: II  TM Distance: > 6 cm  Neck ROM: normal range of motion   Mouth opening: Normal     Cardiovascular  Regular rate and rhythm,  S1 and S2 normal,  no murmur, click, rub, or gallop             Dental  No notable dental hx       Pulmonary  Breath sounds clear to auscultation               Abdominal  GI exam deferred       Other Findings            Anesthetic Plan    ASA: 2  Anesthesia type: general      Post-op pain plan if not by surgeon: peripheral nerve block single    Induction: Intravenous  Anesthetic plan and risks discussed with: Patient

## 2022-05-26 NOTE — OP NOTES
Operative Report     Patient Name: Chance Shrestha    Patient YOB: 1955    [facility] MRN: [id number]    Date of Procedure: 05/26/22    Surgical Location: [facility]     Pre-operative Diagnosis: left closed displaced midshaft clavicle fracture     Post-operative Diagnosis: left closed displaced midshaft clavicle fracture     Procedure: left clavicle open reduction internal fixation     Surgeon: Lauren Guevara MD     Assistant/Staff: Circ-1: Shelly Simon RN  Scrub Tech-1: Cely Zambrano  Surg Asst-1: Saba Le    Anesthesia: General     Preoperative IV Antibiotics: Ancef 2g     Findings: comminuted fracture with several butterfly fragments, fracture reduced, implants placed     Estimated Blood Loss: 25mL     Implants: Ariana clavicle plate and screws     Specimens: None     Complications: None     Disposition: PACU - hemodynamically stable. Condition: stable        Indication for Procedure: The patient sustained a midshaft left clavicle fracture. We discussed nonoperative and operative treatment options, and because of its displacement I recommended surgery. We discussed the risks and benefits, including chest wall numbness and plate prominence which can be agitating. Patient agreed to proceed forth with surgery. Procedure in Detail:  The patient was met in the preoperative holding area. The appropriate surgical site was marked. A consent form was signed and agreed to proceed forth with surgery. A preoperative interscalene block was given by anesthesia. The patient was brought into the operating room and placed on the radiolucent operating room table with a bump under the shoulder blade. General anesthesia was administered. The left arm and clavicle were prepped and draped in the usual sterile fashion. A multidisciplinary timeout was performed and prophylactic antibiotics were dosed.  X-ray C-arm fluoroscopy was used throughout the case for fracture reduction and implant placement. An incision was made directly over the left clavicle centering at the fracture site. Skin flaps were elevated. I then incised the clavipectoral fascia to arrive at the clavicle. Fracture was identified with 2 butterfly fragments. The smaller piece did not have fascial attachments and was removed. I used a reduction clamp to reduce the fracture to length. I then selected a plate and applied it to the bone. It fit the patient's anatomy well. I then placed 5 nonlocking screws bicortically, 3 medial and 2 lateral to the fracture site. I then placed one locking screw in each the medial and lateral pieces. I then used a nonlocking screw to reduce the remaining butterfly fragment centrally. The fracture and plating was stable to shoulder range of motion. I copiously irrigated the wound. The fascia was closed using #1 Vicryl suture. The skin was closed using 2-0 Vicryl suture subcutaneously followed by 3-0 Monocryl suture subcuticularly. This was covered with Dermabond. It was covered with Aquacel dressing. Surgical counts were correct at the end of the procedure. The patient was awakened from anesthesia and placed on postoperative bed. The patient was brought to the PACU in stable condition. Postoperative plan: The left upper extremity will be nonweightbearing. A sling will be worn for comfort. Three times daily it should be removed to do wrist, elbow and shoulder pendulums. The patient will follow up in the office in 2 weeks.            Marielle Beebe MD      05/26/22

## 2022-05-26 NOTE — DISCHARGE INSTRUCTIONS
Dr. Roxy Simmons Fracture treated by Open Reduction Internal Fixation Postoperative Instructions     Follow-Up Appointment:  o Follow up in the office 2 weeks after surgery. If this appointment is not already scheduled, please call our office at [phone].  Activity:  o The operative extremity is Non-weightbearing. You should NOT use this arm for any pushing, pulling, reaching, lifting or assistance getting to or maintaining a standing position.  o You can use the operative-sided hand. You can feed yourself. You should not carry objects more than 2 pounds. o Ambulate every hour. Use the incentive spirometer every half hour when resting.  o A sling will be used for the first 2 weeks when sleeping or ambulating. You can transition out of the sling as you can tolerate.  o Remove the sling three times daily to do range of motion exercises of the elbow and wrist. It is ok to do pendulum exercises to the shoulder.  o Application of the ice packs will prevent and treat inflammation and reduce pain and swelling. You should ice the incisional area at least 3 times a day, 20-30 minutes at a time. o Prevent any falls. Clear your living environment of rugs or floor objects that may be tripping hazards.  Dressings / Wound Care:  o Keep dressing in place until the follow-up visit.   o Once your waterproof dressing has been removed, you may change bandages daily if you like or leave them open to air. These can be purchased at your local pharmacy. o The sutures or staples will be removed at your 2 week follow up.  o Dermabond (skin glue) may be used to help close the incision, which appears as a thin, transparent covering over the incision. Do not pick or pull at this covering, this will fall off naturally within 2-3 weeks. o Do not apply antibiotic ointment, creams or lotions to your incision.  Showering / Bathing:  o If your incision is dry without drainage, you may shower following your discharge home. The dressing is waterproof if well affixed to skin.  o You may shower with the waterproof dressing in place, but please be sure it is adhered to the skin and does not allow water to get underneath.   o It is fine to have water run over the incision after the waterproof dressing has been removed. o Do not vigorously scrub your incision. o Do not soak or submerge in a bath, pool, jacuzzi, ocean, river or lake for 6 weeks after surgery. o If there is continued drainage or you are concerned contact Dr. Gerry Munguia office prior to showering [phone]   Diet:  o You may advance to your regular diet as tolerated. o Proper nutrition is crucial to healing. Make sure you are eating as healthily as possible and following a balanced. protein-rich diet after your surgery. o Avoid processed foods and eat fruits and vegetables.  Medications:  o It is our goal to keep you as comfortable as possible after your surgery. Please take your medications as prescribed without exceeding the recommended dosage. o It is also important to understand that pain has a cycle. It begins and increases until medication interrupts it. The aim of good pain control is to stop the pain before it becomes intolerable. The key is to stay ahead of the pain.  o We encourage patients to discontinue opioid pain medications as soon as possible after surgery. o The side effects of these medications can be substantial, and the narcotic medications are not mandatory. You may substitute a prescribed narcotic with over-the-counter Tylenol. o Pain medications may cause constipation. Over-the-counter Colace twice daily and Miralax while taking the narcotic medication should help prevent constipation. o Other side effects of pain medication include dizziness, headache, nausea, vomiting, and urinary retention. o Discontinue the pain medication if you develop itching, rash, shortness of breath, or difficulties swallowing.   If these symptoms become severe or are not relieved by discontinuing the medication, you should seek immediate medical attention. o Refills of pain medication are authorized during office hours only (8 AM- 4 PM Monday through Friday). Our office will not prescribe narcotics beyond 6 weeks from the date of your surgery. o Narcotics will not be called into the pharmacy, and, in most cases, you must be seen clinically.  Driving:  o You should not return to driving until you are off all narcotic pain medications and able to safely control and steer a motor vehicle. This may take 6 weeks or more because of the limited shoulder motion and activity.  Airports and metal detectors:  o ID cards are no longer given after joint replacement, as they are not accepted by TSA security. Most joint replacements do not set off metal detectors. If the detector is set off, just tell the  you have a joint replacement.  Signs/Symptoms of Concern:   o Contact Dr. Reggie Biggs office if any of the following signs or symptoms develop. Please be advised if a problem arises which you feel requires immediate medical attention you should seek medical attention at the closest ER.  - Temperature greater than 101.5 for more than 8 hrs  - Fever and/or chills that persist for greater than 8 hr.  - A sudden increase in pain/swelling/ or tenderness in the back of your calf or thigh that is not relieved with ice/elevation and pain medication. o Increased drainage from your incision, increased redness or warmth at the area of your incision or a sudden increase in swelling or redness that persists despite ice and elevation      If you have questions or concerns, please call Dr. Reggie Biggs staff    Office: Phone [phone]  Fax [phone]    Office Address: Pritesh Ferraro M.D.     [address]    Pritesh Ferraro MD      05/26/22 ______________________________________________________________________    Anesthesia Discharge Instructions    After general anesthesia or intervenous sedation, for 24 hours or while taking prescription Narcotics:  · Limit your activities  · Do not drive or operate hazardous machinery  · If you have not urinated within 8 hours after discharge, please contact your surgeon on call. · Do not make important personal or business decisions  · Do not drink alcoholic beverages    Report the following to your surgeon:  · Excessive pain, swelling, redness or odor of or around the surgical area  · Temperature over 100.5 degrees  · Nausea and vomiting lasting longer than 4 hours or if unable to take medication  · Any signs of decreased circulation or nerve impairment to extremity:  Change in color, persistent numbness, tingling, coldness or increased pain.   · Any questions

## 2022-05-26 NOTE — PROGRESS NOTES
Discharge instructions and medications reviewed with patient and sister. Opportunity was given for patient and responsible party to ask questions. No further questions at this time. Responsible party and patient verbalizes understanding of discharge instructions. A Copy of discharge instructions given to patient. Patient discharged with all belongings. Meryle Sandman

## 2022-05-26 NOTE — PERIOP NOTES
3758: LT supraclavicular nerve block done by Dr Vinay Pelayo using 30cc of 0.5% ropivicaine with US guidance, with pt monitored, O2 @ 3l/m/nc and IV sedation given. Pt tolerated procedure fairly well. VSS post block: 635/69-21-68, SaO2=97% on 3l/m/nc. Groggy but arouses easily to verbal stimuli. See anesthesia note.

## 2023-05-21 NOTE — ANESTHESIA PROCEDURE NOTES
Peripheral Block    Start time: 5/26/2022 8:41 AM  End time: 5/26/2022 8:50 AM  Performed by: Andreea Vasquez MD  Authorized by: Andreea Vasquez MD       Pre-procedure:    Indications: at surgeon's request and post-op pain management    Preanesthetic Checklist: patient identified, risks and benefits discussed, site marked, timeout performed, anesthesia consent given and patient being monitored      Block Type:   Block Type:  Supraclavicular  Laterality:  Left  Monitoring:  Standard ASA monitoring, continuous pulse ox, frequent vital sign checks, heart rate, responsive to questions and oxygen  Injection Technique:  Single shot  Procedures: ultrasound guided    Patient Position: supine  Prep: chlorhexidine    Location:  Supraclavicular  Needle Type:  Stimuplex  Needle Gauge:  22 G  Needle Localization:  Ultrasound guidance  Motor Response comment:   Motor Response: minimal motor response >0.4 mA   Medication Injected:  Ropivacaine (PF) (NAROPIN)(0.5%) 5 mg/mL injection, 30 mL    Assessment:  Number of attempts:  1  Injection Assessment:  Incremental injection every 5 mL, local visualized surrounding nerve on ultrasound, negative aspiration for blood, no intravascular symptoms and no paresthesia  Patient tolerance:  Patient tolerated the procedure well with no immediate complications Universal Safety Interventions

## (undated) DEVICE — Device

## (undated) DEVICE — SUTURE VCRL SZ 2-0 L36IN ABSRB UD L36MM CT-1 1/2 CIR J945H

## (undated) DEVICE — BASIN ST MAJOR-NO CAUTERY: Brand: MEDLINE INDUSTRIES, INC.

## (undated) DEVICE — REM POLYHESIVE ADULT PATIENT RETURN ELECTRODE: Brand: VALLEYLAB

## (undated) DEVICE — DRESSING HYDROCOLLOID BORDER 35X10 IN ALUM PRIMASEAL

## (undated) DEVICE — DRAPE,U/ SHT,SPLIT,PLAS,STERIL: Brand: MEDLINE

## (undated) DEVICE — GOWN,PREVENTION PLUS,XLN/XL,ST,24/CS: Brand: MEDLINE

## (undated) DEVICE — SOLUTION SURG PREP 26 CC PURPREP

## (undated) DEVICE — DERMABOND SKIN ADH 0.7ML -- DERMABOND ADVANCED 12/BX

## (undated) DEVICE — SOLUTION IRRIG 1000ML 0.9% SOD CHL USP POUR PLAS BTL

## (undated) DEVICE — TUBING SUCT 10FR MAL ALUM SHFT FN CAP VENT UNIV CONN W/ OBT

## (undated) DEVICE — SPONGE GZ W4XL4IN COT 12 PLY TYP VII WVN C FLD DSGN

## (undated) DEVICE — TOWEL SURG W17XL27IN STD BLU COT NONFENESTRATED PREWASHED

## (undated) DEVICE — DRAPE,REIN 53X77,STERILE: Brand: MEDLINE

## (undated) DEVICE — HANDLE LT SNAP ON ULT DURABLE LENS FOR TRUMPF ALC DISPOSABLE

## (undated) DEVICE — SUTURE VCRL 1 L27IN ABSRB CT BRAID COAT UD J281H

## (undated) DEVICE — INTENDED FOR TISSUE SEPARATION, AND OTHER PROCEDURES THAT REQUIRE A SHARP SURGICAL BLADE TO PUNCTURE OR CUT.: Brand: BARD-PARKER ® CARBON RIB-BACK BLADES

## (undated) DEVICE — SUTURE MCRYL SZ 3-0 L27IN ABSRB UD L19MM PS-2 3/8 CIR PRIM Y427H

## (undated) DEVICE — BANDAGE COBAN 4 IN COMPR W4INXL5YD FOAM COHESIVE QUIK STK SELF ADH SFT

## (undated) DEVICE — SLING ORTHOPEDIC PCH UNIV 19.5X9 IN 2-39 IN ARM W/ FOAM STRP

## (undated) DEVICE — C-ARM: Brand: UNBRANDED

## (undated) DEVICE — 3M™ STERI-DRAPE™ U-DRAPE 1015: Brand: STERI-DRAPE™

## (undated) DEVICE — GARMENT,MEDLINE,DVT,INT,CALF,MED, GEN2: Brand: MEDLINE

## (undated) DEVICE — GLOVE ORTHO 7 1/2   MSG9475

## (undated) DEVICE — GLOVE SURG SZ 8 L12IN FNGR THK79MIL GRN LTX FREE

## (undated) DEVICE — ROCKER SWITCH PENCIL BLADE ELECTRODE, HOLSTER: Brand: EDGE